# Patient Record
Sex: FEMALE
[De-identification: names, ages, dates, MRNs, and addresses within clinical notes are randomized per-mention and may not be internally consistent; named-entity substitution may affect disease eponyms.]

---

## 2021-02-19 ENCOUNTER — FORM ENCOUNTER (OUTPATIENT)
Age: 76
End: 2021-02-19

## 2021-03-10 PROBLEM — Z00.00 ENCOUNTER FOR PREVENTIVE HEALTH EXAMINATION: Status: ACTIVE | Noted: 2021-03-10

## 2021-04-06 ENCOUNTER — APPOINTMENT (OUTPATIENT)
Dept: ORTHOPEDIC SURGERY | Facility: CLINIC | Age: 76
End: 2021-04-06
Payer: MEDICARE

## 2021-04-06 VITALS
HEART RATE: 77 BPM | HEIGHT: 67 IN | BODY MASS INDEX: 25.58 KG/M2 | DIASTOLIC BLOOD PRESSURE: 72 MMHG | WEIGHT: 163 LBS | SYSTOLIC BLOOD PRESSURE: 132 MMHG

## 2021-04-06 DIAGNOSIS — Z82.61 FAMILY HISTORY OF ARTHRITIS: ICD-10-CM

## 2021-04-06 DIAGNOSIS — Z87.39 PERSONAL HISTORY OF OTHER DISEASES OF THE MUSCULOSKELETAL SYSTEM AND CONNECTIVE TISSUE: ICD-10-CM

## 2021-04-06 DIAGNOSIS — Z85.3 PERSONAL HISTORY OF MALIGNANT NEOPLASM OF BREAST: ICD-10-CM

## 2021-04-06 DIAGNOSIS — Z86.79 PERSONAL HISTORY OF OTHER DISEASES OF THE CIRCULATORY SYSTEM: ICD-10-CM

## 2021-04-06 DIAGNOSIS — Z60.2 PROBLEMS RELATED TO LIVING ALONE: ICD-10-CM

## 2021-04-06 DIAGNOSIS — Z78.9 OTHER SPECIFIED HEALTH STATUS: ICD-10-CM

## 2021-04-06 DIAGNOSIS — M25.561 PAIN IN RIGHT KNEE: ICD-10-CM

## 2021-04-06 DIAGNOSIS — Z85.118 PERSONAL HISTORY OF OTHER MALIGNANT NEOPLASM OF BRONCHUS AND LUNG: ICD-10-CM

## 2021-04-06 PROCEDURE — 99204 OFFICE O/P NEW MOD 45 MIN: CPT

## 2021-04-06 PROCEDURE — 73562 X-RAY EXAM OF KNEE 3: CPT | Mod: 50

## 2021-04-06 SDOH — SOCIAL STABILITY - SOCIAL INSECURITY: PROBLEMS RELATED TO LIVING ALONE: Z60.2

## 2021-04-06 NOTE — REVIEW OF SYSTEMS
[SOB on Exertion] : shortness of breath during exertion [Heartburn] : heartburn [Joint Pain] : joint pain [Joint Stiffness] : joint stiffness [Sleep Disturbances] : sleep disturbances [Muscle Weakness] : muscle weakness [Easy Bruising] : easy bruising

## 2021-04-13 PROBLEM — Z87.39 HISTORY OF ARTHRITIS: Status: RESOLVED | Noted: 2021-04-06 | Resolved: 2021-04-13

## 2021-04-13 PROBLEM — Z85.3 HISTORY OF MALIGNANT NEOPLASM OF BREAST: Status: RESOLVED | Noted: 2021-04-06 | Resolved: 2021-04-13

## 2021-04-13 PROBLEM — Z78.9 CURRENT NON-SMOKER: Status: ACTIVE | Noted: 2021-04-06

## 2021-04-13 PROBLEM — M25.561 RIGHT ANTERIOR KNEE PAIN: Status: ACTIVE | Noted: 2021-04-06

## 2021-04-13 PROBLEM — Z60.2 PERSON LIVING ALONE: Status: ACTIVE | Noted: 2021-04-06

## 2021-04-13 PROBLEM — Z82.61 FAMILY HISTORY OF ARTHRITIS: Status: ACTIVE | Noted: 2021-04-06

## 2021-04-13 PROBLEM — Z86.79 HISTORY OF CARDIAC DISORDER: Status: RESOLVED | Noted: 2021-04-06 | Resolved: 2021-04-13

## 2021-04-13 PROBLEM — Z78.9 EXERCISES OCCASIONALLY: Status: ACTIVE | Noted: 2021-04-06

## 2021-04-13 PROBLEM — Z78.9 DOES NOT USE ILLICIT DRUGS: Status: ACTIVE | Noted: 2021-04-06

## 2021-04-13 PROBLEM — Z78.9 CONSUMES ALCOHOL OCCASIONALLY: Status: ACTIVE | Noted: 2021-04-06

## 2021-04-13 PROBLEM — Z85.118 HISTORY OF MALIGNANT NEOPLASM OF LUNG: Status: RESOLVED | Noted: 2021-04-06 | Resolved: 2021-04-13

## 2021-04-13 RX ORDER — ANASTROZOLE TABLETS 1 MG/1
TABLET ORAL
Refills: 0 | Status: ACTIVE | COMMUNITY

## 2021-04-13 RX ORDER — ALBUTEROL SULFATE 90 UG/1
AEROSOL, METERED RESPIRATORY (INHALATION)
Refills: 0 | Status: ACTIVE | COMMUNITY

## 2021-04-13 RX ORDER — RIVAROXABAN 2.5 MG/1
TABLET, FILM COATED ORAL
Refills: 0 | Status: ACTIVE | COMMUNITY

## 2021-04-13 RX ORDER — FLUTICASONE FUROATE, UMECLIDINIUM BROMIDE AND VILANTEROL TRIFENATATE 200; 62.5; 25 UG/1; UG/1; UG/1
POWDER RESPIRATORY (INHALATION)
Refills: 0 | Status: ACTIVE | COMMUNITY

## 2021-04-13 RX ORDER — VERAPAMIL HYDROCHLORIDE 80 MG/1
TABLET ORAL
Refills: 0 | Status: ACTIVE | COMMUNITY

## 2021-04-13 NOTE — PHYSICAL EXAM
[FreeTextEntry1] : The patient stands in good balance.  She is able to move both knees with flexion to 130 degrees.  The left knee gets to full extension but the right knee stops at 5 degrees.  She has significant patellofemoral crepitus.  She has tenderness anteriorly medially along the meniscal joint line as well as at the patellofemoral joint.  She occasionally has some lateral sided pain.  She has no posterior pain and no posterior tenderness.  She has questionable boggy mass posteriorly however not at all tender and is ill-defined.  There is no obvious solid mass.  Stable to varus and valgus testing.  She has an overall straightening.  Her calves are soft and she is neurovascularly intact. [Masses] : no masses [Skin Changes - Describe changes:] : No skin changes noted

## 2021-04-13 NOTE — HISTORY OF PRESENT ILLNESS
[FreeTextEntry1] : Very pleasant 76-year-old female comes in complaining of right worse than left knee pain.  Is been going on for a few years and increasingly worse.  She saw her regular orthopedist was given her viscosupplementation as well as steroids with some relief.  Pain is better than it was a few months ago.\par \par Of note the patient had bilateral small breast masses taken out in 2013 and needed no further treatment.  She also had a small lung mass taken out recently which was found to be small lung cancer but she does not think she needs any further treatment for this.  The surgeon the oncologist is meeting with her. [de-identified] : Injections

## 2021-04-13 NOTE — DISCUSSION/SUMMARY
[Surgical risks reviewed] : Surgical risks reviewed [All Questions Answered] : Patient (and family) had all questions answered to an agreeable level of satisfaction [Interested in Proceeding] : Patient (and family) expressed understanding and interest in proceeding with the plan as outlined [de-identified] : At this point patient is interested in pursuing arthroplasty however she still needs to deal with lung issues first.  Furthermore she had an injection approximately a month and a half ago.  I told her she would need to wait 3 months after her injection.  She is interested in a forward.  After she sees her oncologist and gets cleared from oncology and heals from her lung surgery then we can plan for knee replacement.  She will call us back when this is done.\par We discussed the risks benefits and alternatives of arthroplasty including the risks of bleeding requiring transfusion, infection, late infection from bacteremia, instability, malalignment, periprosthetic fracture, early need for revision, loosening, possible dislocation, numbness in the area of surgery, continued pain, altered gait, limb length discrepancy, deep vein thrombosis and medical and anesthetic complications.\par \par If imaging was ordered, the patient was told to make an appointment to review findings right after all imaging is completed.\par \par We discussed risks, benefits and alternatives. Rationale of care was reviewed and all questions were answered. Patient (and family) had all questions answered to her degree of the level of satisfaction. Patient (and family) expressed understanding and interest in proceeding with the plan as outlined.\par \par \par \par \par This note was done with a voice recognition transcription software and any typos are related to this rather than medical error. Surgical risks reviewed. Patient (and family) had all questions answered to an agreeable level of satisfaction. Patient (and family) expressed understanding and interest in proceeding with the plan as outlined.  \par

## 2021-04-13 NOTE — DATA REVIEWED
[de-identified] : X-rays today of bilateral knees AP lateral patella view show bilateral significant degenerative arthrosis with significant osteophytes joint space narrowing and medial joint structure.  There are no obvious osseous masses posteriorly.\par \par MRI scan of the right knee with and without contrast from February 20, 2021 shows a few popliteal nodules communicated with the joint most likely synovial cyst with loose bodies.  There is also significant degenerative changes and meniscal tears and a moderate effusion.  This was done because of a questionable mass seen on ultrasound.